# Patient Record
Sex: MALE
[De-identification: names, ages, dates, MRNs, and addresses within clinical notes are randomized per-mention and may not be internally consistent; named-entity substitution may affect disease eponyms.]

---

## 2022-09-12 PROBLEM — Z00.00 ENCOUNTER FOR PREVENTIVE HEALTH EXAMINATION: Status: ACTIVE | Noted: 2022-09-12

## 2022-09-13 ENCOUNTER — APPOINTMENT (OUTPATIENT)
Dept: UROLOGY | Facility: CLINIC | Age: 57
End: 2022-09-13

## 2022-09-13 VITALS
TEMPERATURE: 99.1 F | HEART RATE: 92 BPM | HEIGHT: 67 IN | WEIGHT: 198 LBS | DIASTOLIC BLOOD PRESSURE: 89 MMHG | BODY MASS INDEX: 31.08 KG/M2 | SYSTOLIC BLOOD PRESSURE: 134 MMHG

## 2022-09-13 DIAGNOSIS — Z87.39 PERSONAL HISTORY OF OTHER DISEASES OF THE MUSCULOSKELETAL SYSTEM AND CONNECTIVE TISSUE: ICD-10-CM

## 2022-09-13 DIAGNOSIS — Z80.42 FAMILY HISTORY OF MALIGNANT NEOPLASM OF PROSTATE: ICD-10-CM

## 2022-09-13 DIAGNOSIS — Z84.1 FAMILY HISTORY OF DISORDERS OF KIDNEY AND URETER: ICD-10-CM

## 2022-09-13 DIAGNOSIS — Z12.5 ENCOUNTER FOR SCREENING FOR MALIGNANT NEOPLASM OF PROSTATE: ICD-10-CM

## 2022-09-13 DIAGNOSIS — Z78.9 OTHER SPECIFIED HEALTH STATUS: ICD-10-CM

## 2022-09-13 LAB
BILIRUB UR QL STRIP: NORMAL
CLARITY UR: CLEAR
COLLECTION METHOD: NORMAL
GLUCOSE UR-MCNC: NORMAL
HCG UR QL: 0.2 EU/DL
HGB UR QL STRIP.AUTO: NORMAL
KETONES UR-MCNC: NORMAL
LEUKOCYTE ESTERASE UR QL STRIP: NORMAL
NITRITE UR QL STRIP: NORMAL
PH UR STRIP: 6.5
PROT UR STRIP-MCNC: NORMAL
SP GR UR STRIP: 1.01

## 2022-09-13 PROCEDURE — 99204 OFFICE O/P NEW MOD 45 MIN: CPT

## 2022-09-13 PROCEDURE — 81003 URINALYSIS AUTO W/O SCOPE: CPT | Mod: QW

## 2022-09-13 RX ORDER — FEBUXOSTAT 80 MG/1
TABLET ORAL
Refills: 0 | Status: ACTIVE | COMMUNITY

## 2022-09-13 RX ORDER — SIMVASTATIN 80 MG/1
TABLET, FILM COATED ORAL
Refills: 0 | Status: ACTIVE | COMMUNITY

## 2022-09-13 RX ORDER — CALCIUM CARBONATE 260MG(650)
TABLET,CHEWABLE ORAL
Refills: 0 | Status: ACTIVE | COMMUNITY

## 2022-09-13 RX ORDER — DOXEPIN 6 MG/1
TABLET, FILM COATED ORAL
Refills: 0 | Status: ACTIVE | COMMUNITY

## 2022-09-13 NOTE — HISTORY OF PRESENT ILLNESS
[FreeTextEntry1] : 57 year old male who presents with microscopic hematuria. It was incidentally found during a pre-op evaluation in April 2022 for a left inguinal hernia repair.  Urine cytology was performed both in April and August which came back showing atypical cells.  Renal US was done on 8/23/22 which revealed a possible nonobstructing stone in the left kidney, no hydronephrosis on either side. He never had a CT scan done. He's had a history of microscopic hematuria in the past due to kidney stones per his PCP. He has a longstanding history of kidney stones and has passed a couple in the past. He does not have any urinary complaints at this time, denies gross hematuria. No flank pain. His father had history of kidney stones and prostate CA, treated with RT.\par \par The patient's most recent PSA was WNL.  \par PSA:\par 8/23/22: 0.98

## 2022-09-13 NOTE — ASSESSMENT
[FreeTextEntry1] : 57 year old male with microscopic hematuria. \par \par A discussion was held with the patient regarding hematuria. The anatomy of the urinary tract was reviewed. Possible etiologies of hematuria were reviewed, including but not limited to, kidney, ureteral, bladder, and urethral sources. Urologic versus non-urologic causes of hematuria were reviewed including benign and malignant causes. The possibility of a missed lesion was reviewed as well as the chance of developing a lesion despite a negative evaluation. The work-up for hematuria was discussed including examination of the upper and lower urinary tract. The AUA guidelines on hematuria were reviewed. Follow up evaluation was reviewed. The possible need to repeat the evaluation was also discussed. \par \par  We will do a full microhematuria evaluation to rule out any other potential causes of his hematuria.  CTU will assess for any stones.  \par \par He will continue with annual PCa screening.  His FAUSTO and PSA from this year. \par \par Plan:\par 1. UA, urine culture\par 2. CT Urogram\par 3. Urine cytology, FISH\par 4. F/u after above\par 5. Annual PSA screening

## 2022-09-13 NOTE — PHYSICAL EXAM
[General Appearance - Well Developed] : well developed [General Appearance - Well Nourished] : well nourished [Normal Appearance] : normal appearance [Well Groomed] : well groomed [General Appearance - In No Acute Distress] : no acute distress [Edema] : no peripheral edema [Respiration, Rhythm And Depth] : normal respiratory rhythm and effort [Exaggerated Use Of Accessory Muscles For Inspiration] : no accessory muscle use [Abdomen Soft] : soft [Abdomen Tenderness] : non-tender [Costovertebral Angle Tenderness] : no ~M costovertebral angle tenderness [Urethral Meatus] : meatus normal [Scrotum] : the scrotum was normal [Testes Mass (___cm)] : there were no testicular masses [No Prostate Nodules] : no prostate nodules [Normal Station and Gait] : the gait and station were normal for the patient's age [] : no rash [No Focal Deficits] : no focal deficits [Oriented To Time, Place, And Person] : oriented to person, place, and time [Affect] : the affect was normal [Mood] : the mood was normal [Not Anxious] : not anxious [Penis Abnormality] : normal circumcised penis [Epididymis] : the epididymides were normal [Testes Tenderness] : no tenderness of the testes [Prostate Tenderness] : the prostate was not tender [Prostate Size ___ (0-4)] : prostate size [unfilled] (scale: 0-4)

## 2022-09-14 LAB
APPEARANCE: CLEAR
BACTERIA: NEGATIVE
BILIRUBIN URINE: NEGATIVE
BLOOD URINE: NEGATIVE
COLOR: YELLOW
GLUCOSE QUALITATIVE U: NEGATIVE
HYALINE CASTS: 0 /LPF
KETONES URINE: NEGATIVE
LEUKOCYTE ESTERASE URINE: NEGATIVE
MICROSCOPIC-UA: NORMAL
NITRITE URINE: NEGATIVE
PH URINE: 6.5
PROTEIN URINE: NEGATIVE
RED BLOOD CELLS URINE: 3 /HPF
SPECIFIC GRAVITY URINE: 1.01
SQUAMOUS EPITHELIAL CELLS: 0 /HPF
UROBILINOGEN URINE: NORMAL
WHITE BLOOD CELLS URINE: 0 /HPF

## 2022-09-15 LAB — BACTERIA UR CULT: NORMAL

## 2022-09-19 ENCOUNTER — NON-APPOINTMENT (OUTPATIENT)
Age: 57
End: 2022-09-19

## 2022-09-19 LAB — URINE CYTOLOGY: NORMAL

## 2022-09-23 ENCOUNTER — OUTPATIENT (OUTPATIENT)
Dept: OUTPATIENT SERVICES | Facility: HOSPITAL | Age: 57
LOS: 1 days | End: 2022-09-23

## 2022-09-23 ENCOUNTER — APPOINTMENT (OUTPATIENT)
Dept: MRI IMAGING | Facility: CLINIC | Age: 57
End: 2022-09-23

## 2022-09-23 ENCOUNTER — RESULT REVIEW (OUTPATIENT)
Age: 57
End: 2022-09-23

## 2022-09-23 PROCEDURE — 72197 MRI PELVIS W/O & W/DYE: CPT | Mod: 26

## 2022-09-23 PROCEDURE — 74183 MRI ABD W/O CNTR FLWD CNTR: CPT | Mod: 26

## 2022-10-06 ENCOUNTER — NON-APPOINTMENT (OUTPATIENT)
Age: 57
End: 2022-10-06

## 2022-10-13 ENCOUNTER — APPOINTMENT (OUTPATIENT)
Dept: UROLOGY | Facility: CLINIC | Age: 57
End: 2022-10-13

## 2022-10-13 DIAGNOSIS — R31.29 OTHER MICROSCOPIC HEMATURIA: ICD-10-CM

## 2022-10-13 LAB
BILIRUB UR QL STRIP: NORMAL
CLARITY UR: CLEAR
COLLECTION METHOD: NORMAL
GLUCOSE UR-MCNC: NORMAL
HCG UR QL: 0.2 EU/DL
HGB UR QL STRIP.AUTO: NORMAL
KETONES UR-MCNC: NORMAL
LEUKOCYTE ESTERASE UR QL STRIP: NORMAL
NITRITE UR QL STRIP: NORMAL
PH UR STRIP: 6
PROT UR STRIP-MCNC: NORMAL
SP GR UR STRIP: 1.02

## 2022-10-13 PROCEDURE — 52000 CYSTOURETHROSCOPY: CPT

## 2022-10-13 PROCEDURE — 99213 OFFICE O/P EST LOW 20 MIN: CPT | Mod: 25

## 2022-10-13 PROCEDURE — 81003 URINALYSIS AUTO W/O SCOPE: CPT | Mod: QW

## 2022-10-13 NOTE — ASSESSMENT
[FreeTextEntry1] : 56 yo male with negative hematuria evaluation.  I would like to repeat his urine FISH in the setting of atypical cytology.  Assuming this is normal then he will have had a negative w/u and he can follow up as needed. \par \par Plan:\par 1. Urine FISH

## 2022-10-13 NOTE — HISTORY OF PRESENT ILLNESS
[FreeTextEntry1] : 9/13/22:\par 57 year old male who presents with microscopic hematuria. It was incidentally found during a pre-op evaluation in April 2022 for a left inguinal hernia repair.  Urine cytology was performed both in April and August which came back showing atypical cells.  Renal US was done on 8/23/22 which revealed a possible nonobstructing stone in the left kidney, no hydronephrosis on either side. He never had a CT scan done. He's had a history of microscopic hematuria in the past due to kidney stones per his PCP. He has a longstanding history of kidney stones and has passed a couple in the past. He does not have any urinary complaints at this time, denies gross hematuria. No flank pain. His father had history of kidney stones and prostate CA, treated with RT.\par \par The patient's most recent PSA was WNL.  \par PSA:\par 8/23/22: 0.98\par \par ****************\par 10/13/22:\par Patient returns for follow up.  MRU showed no suspicious renal, ureteral, or bladder lesions.  FISH was not properly processed so no results are available.  Cytology again showed cellular atypia.  He is otherwise well without complaint. \par \par Cystoscopy today showed a normal appearing lower urinary tract.

## 2022-10-13 NOTE — PHYSICAL EXAM
[General Appearance - Well Developed] : well developed [Normal Appearance] : normal appearance [Abdomen Soft] : soft [Skin Color & Pigmentation] : normal skin color and pigmentation [Heart Rate And Rhythm] : Heart rate and rhythm were normal [] : no respiratory distress [Oriented To Time, Place, And Person] : oriented to person, place, and time [Normal Station and Gait] : the gait and station were normal for the patient's age [No Focal Deficits] : no focal deficits [Urethral Meatus] : meatus normal [Penis Abnormality] : normal circumcised penis [Epididymis] : the epididymides were normal [Testes Tenderness] : no tenderness of the testes [Testes Mass (___cm)] : there were no testicular masses

## 2022-10-19 LAB — HLX UV FISH FINAL REPORT: NORMAL

## 2022-10-21 ENCOUNTER — LABORATORY RESULT (OUTPATIENT)
Age: 57
End: 2022-10-21

## 2022-10-25 ENCOUNTER — NON-APPOINTMENT (OUTPATIENT)
Age: 57
End: 2022-10-25

## 2022-10-25 LAB — HLX UV FISH FINAL REPORT: NORMAL

## 2022-10-25 NOTE — HISTORY OF PRESENT ILLNESS
[FreeTextEntry1] : Spoke to patient regarding negative urine cytology result. Still awaiting FISH test, in process which might take another week to result. He verbalized understanding.